# Patient Record
Sex: FEMALE | Race: WHITE | NOT HISPANIC OR LATINO | Employment: FULL TIME | URBAN - METROPOLITAN AREA
[De-identification: names, ages, dates, MRNs, and addresses within clinical notes are randomized per-mention and may not be internally consistent; named-entity substitution may affect disease eponyms.]

---

## 2022-10-16 ENCOUNTER — OFFICE VISIT (OUTPATIENT)
Dept: URGENT CARE | Facility: CLINIC | Age: 52
End: 2022-10-16
Payer: COMMERCIAL

## 2022-10-16 VITALS
OXYGEN SATURATION: 99 % | HEART RATE: 94 BPM | SYSTOLIC BLOOD PRESSURE: 169 MMHG | BODY MASS INDEX: 30.21 KG/M2 | WEIGHT: 160 LBS | DIASTOLIC BLOOD PRESSURE: 90 MMHG | TEMPERATURE: 99.9 F | RESPIRATION RATE: 20 BRPM | HEIGHT: 61 IN

## 2022-10-16 DIAGNOSIS — J06.9 VIRAL UPPER RESPIRATORY TRACT INFECTION: Primary | ICD-10-CM

## 2022-10-16 PROCEDURE — 99213 OFFICE O/P EST LOW 20 MIN: CPT | Performed by: PHYSICIAN ASSISTANT

## 2022-10-16 RX ORDER — ALPRAZOLAM 0.25 MG/1
0.25 TABLET ORAL
COMMUNITY
Start: 2022-04-21

## 2022-10-16 RX ORDER — CETIRIZINE HYDROCHLORIDE 10 MG/1
10 TABLET ORAL DAILY
COMMUNITY

## 2022-10-16 RX ORDER — LOSARTAN POTASSIUM AND HYDROCHLOROTHIAZIDE 12.5; 5 MG/1; MG/1
1 TABLET ORAL DAILY
COMMUNITY
Start: 2022-04-21 | End: 2023-04-21

## 2022-10-16 NOTE — PROGRESS NOTES
[facility]      NAME: Marva Underwood is a 46 y o  female  : 1970    MRN: [id number]  DATE: 2022  TIME: 11:42 AM    Assessment and Plan   Viral upper respiratory tract infection [J06 9]  1  Viral upper respiratory tract infection       Patient Instructions   Viral upper respiratory infection  Stop decongestant due to BP  Recommend mucinex and flonase for cough  Rest, fluids and supportive care  May benefit from a cool mist humidifier on night stand  Tylenol/ibuprofen as needed for pain/fever    Follow up with PCP in 3-5 days  Proceed to  ER if symptoms worsen  Chief Complaint     Chief Complaint   Patient presents with   • URI     Pt presents with worsening URI s/s started approx 2 days ago with worsening redness  History of Present Illness       Minesh Velazquez is a 60-year-old female who presents to clinic complaining cough and nasal congestion x5 days  She describes the cough as productive with a yellow to white sputum  She is also complaining itchy scratchy throat, nasal congestion, bilateral eye redness and this morning her eyes order slightly crusted  She denies any fever, chills, rhinorrhea, ear pain, nausea, vomiting, fatigue, myalgias, loss of taste or smell, recent travel, or exposure      Review of Systems   Review of Systems   Constitutional: Negative for chills, fatigue and fever  HENT: Positive for congestion and sore throat  Negative for ear pain, rhinorrhea, sinus pressure and sinus pain  Eyes: Positive for discharge and redness  Negative for photophobia, pain, itching and visual disturbance  Respiratory: Positive for cough  Negative for shortness of breath  Gastrointestinal: Negative for diarrhea, nausea and vomiting  Musculoskeletal: Negative for myalgias  Neurological: Negative for headaches           Current Medications       Current Outpatient Medications:   •  ALPRAZolam (XANAX) 0 25 mg tablet, Take 0 25 mg by mouth, Disp: , Rfl:   • losartan-hydrochlorothiazide (HYZAAR) 50-12 5 mg per tablet, Take 1 tablet by mouth daily, Disp: , Rfl:   •  cetirizine (ZyrTEC) 10 mg tablet, Take 10 mg by mouth daily, Disp: , Rfl:     Current Allergies     Allergies as of 10/16/2022 - Reviewed 10/16/2022   Allergen Reaction Noted   • Shellfish-derived products - food allergy Shortness Of Breath 09/09/2020   • Latex Rash 09/09/2020            The following portions of the patient's history were reviewed and updated as appropriate: allergies, current medications, past family history, past medical history, past social history, past surgical history and problem list      Past Medical History:   Diagnosis Date   • Anxiety    • Hypertension        History reviewed  No pertinent surgical history  History reviewed  No pertinent family history  Medications have been verified  Objective   /90   Pulse 94   Temp 99 9 °F (37 7 °C)   Resp 20   Ht 5' 1" (1 549 m)   Wt 72 6 kg (160 lb)   SpO2 99%   BMI 30 23 kg/m²   No LMP recorded  Physical Exam     Physical Exam  Vitals and nursing note reviewed  Constitutional:       General: She is not in acute distress  Appearance: Normal appearance  She is not ill-appearing  HENT:      Right Ear: Tympanic membrane, ear canal and external ear normal       Left Ear: Tympanic membrane, ear canal and external ear normal       Nose: Congestion present  Mouth/Throat:      Mouth: Mucous membranes are moist       Pharynx: No oropharyngeal exudate or posterior oropharyngeal erythema  Eyes:      General: Lids are normal  Vision grossly intact  Right eye: No discharge or hordeolum  Left eye: No discharge or hordeolum  Extraocular Movements: Extraocular movements intact  Conjunctiva/sclera:      Right eye: Right conjunctiva is injected  Left eye: Left conjunctiva is injected  Pupils: Pupils are equal, round, and reactive to light     Cardiovascular:      Rate and Rhythm: Normal rate and regular rhythm  Heart sounds: Normal heart sounds  Pulmonary:      Effort: Pulmonary effort is normal  No respiratory distress  Breath sounds: Normal breath sounds  No stridor  No wheezing, rhonchi or rales  Lymphadenopathy:      Cervical: No cervical adenopathy  Neurological:      Mental Status: She is alert and oriented to person, place, and time     Psychiatric:         Mood and Affect: Mood normal          Behavior: Behavior normal

## 2022-10-20 ENCOUNTER — OFFICE VISIT (OUTPATIENT)
Dept: URGENT CARE | Facility: CLINIC | Age: 52
End: 2022-10-20
Payer: COMMERCIAL

## 2022-10-20 VITALS
HEIGHT: 61 IN | WEIGHT: 165.4 LBS | RESPIRATION RATE: 18 BRPM | HEART RATE: 88 BPM | BODY MASS INDEX: 31.23 KG/M2 | TEMPERATURE: 99.6 F | OXYGEN SATURATION: 99 %

## 2022-10-20 DIAGNOSIS — J02.9 SORE THROAT: Primary | ICD-10-CM

## 2022-10-20 LAB
S PYO AG THROAT QL: NEGATIVE
SARS-COV-2 AG UPPER RESP QL IA: NEGATIVE
VALID CONTROL: NORMAL

## 2022-10-20 PROCEDURE — 87811 SARS-COV-2 COVID19 W/OPTIC: CPT | Performed by: PHYSICIAN ASSISTANT

## 2022-10-20 PROCEDURE — 87880 STREP A ASSAY W/OPTIC: CPT | Performed by: PHYSICIAN ASSISTANT

## 2022-10-20 PROCEDURE — 99203 OFFICE O/P NEW LOW 30 MIN: CPT | Performed by: PHYSICIAN ASSISTANT

## 2022-10-20 RX ORDER — AZITHROMYCIN 250 MG/1
TABLET, FILM COATED ORAL
Qty: 6 TABLET | Refills: 0 | Status: SHIPPED | OUTPATIENT
Start: 2022-10-20 | End: 2022-10-24

## 2022-10-20 NOTE — PATIENT INSTRUCTIONS
Flonase, Sudafed, Zyrtec for symptoms  If no improvement or any worsening by Monday morning, may start antibiotic

## 2022-10-20 NOTE — PROGRESS NOTES
[email or web address]        NAME: Liliana Young is a 46 y o  female  : 1970    MRN: [id number]  DATE: 2022  TIME: 6:59 PM    Assessment and Plan   Sore throat [J02 9]  1  Sore throat  POCT rapid strepA    Poct Covid 19 Rapid Antigen Test    azithromycin (ZITHROMAX) 250 mg tablet     Patient absolutely insistent on antibiotics even though the risks of antibiotic overuse and misuse were discussed, will provide a watch and wait prescription  Advised that if she is not better in 4-5 days or is feeling worse, then may begin antibiotics  Recommended Flonase, Zyrtec, Sudafed for relief of postnasal drip  Discussed strict return to care precautions as well as red flag symptoms which should prompt immediate ED referral  Pt verbalized understanding and is in agreement with plan  Please follow up with your primary care provider within the next week  Please remember that your visit today was with an urgent care provider and should not replace follow up with your primary care provider for chronic medical issues or annual physicals  Patient Instructions       Follow up with PCP in 3-5 days  Proceed to  ER if symptoms worsen  Chief Complaint     Chief Complaint   Patient presents with   • Sore Throat     Sore throat since last week was seen  not getting better         History of Present Illness       Liliana Young is a(n) 46 y o  female presenting with URI symptoms x 5 days  Past medical history: HTN, anxiety  Congestion: yes  Sore throat: yes  Cough: yes  Sputum production: no  Fever: no  Body aches: no  Loss of smell/taste: no  GI symptoms: no  Known sick contacts: no  OTC meds tried: mucinex  Reports that she knows her body and needs an antibiotic for the throat dryness  Review of Systems   Review of Systems   Constitutional: Negative for chills, diaphoresis, fatigue and fever  HENT: Positive for congestion, postnasal drip and sore throat   Negative for ear pain, rhinorrhea, sinus pain, sneezing and trouble swallowing  Eyes: Negative for pain and redness  Respiratory: Positive for cough  Negative for chest tightness, shortness of breath and wheezing  Cardiovascular: Negative for chest pain and leg swelling  Gastrointestinal: Negative for diarrhea, nausea and vomiting  Musculoskeletal: Negative for myalgias  Neurological: Negative for dizziness, weakness and headaches  Current Medications       Current Outpatient Medications:   •  ALPRAZolam (XANAX) 0 25 mg tablet, Take 0 25 mg by mouth, Disp: , Rfl:   •  azithromycin (ZITHROMAX) 250 mg tablet, Take 2 tablets today then 1 tablet daily x 4 days, Disp: 6 tablet, Rfl: 0  •  cetirizine (ZyrTEC) 10 mg tablet, Take 10 mg by mouth daily, Disp: , Rfl:   •  losartan-hydrochlorothiazide (HYZAAR) 50-12 5 mg per tablet, Take 1 tablet by mouth daily, Disp: , Rfl:     Current Allergies     Allergies as of 10/20/2022 - Reviewed 10/20/2022   Allergen Reaction Noted   • Shellfish-derived products - food allergy Shortness Of Breath 09/09/2020   • Latex Rash 09/09/2020            The following portions of the patient's history were reviewed and updated as appropriate: allergies, current medications, past family history, past medical history, past social history, past surgical history and problem list      Past Medical History:   Diagnosis Date   • Anxiety    • Hypertension        History reviewed  No pertinent surgical history  History reviewed  No pertinent family history  Medications have been verified  Objective   Pulse 88   Temp 99 6 °F (37 6 °C)   Resp 18   Ht 5' 1" (1 549 m)   Wt 75 kg (165 lb 6 4 oz)   SpO2 99%   BMI 31 25 kg/m²        Physical Exam     Physical Exam  Vitals and nursing note reviewed  Constitutional:       General: She is not in acute distress  Appearance: Normal appearance  She is not toxic-appearing  HENT:      Head: Normocephalic and atraumatic        Right Ear: Tympanic membrane, ear canal and external ear normal       Left Ear: Tympanic membrane, ear canal and external ear normal       Nose: No congestion  Mouth/Throat:      Mouth: Mucous membranes are moist       Pharynx: Oropharynx is clear  No oropharyngeal exudate or posterior oropharyngeal erythema  Tonsils: No tonsillar exudate or tonsillar abscesses  Eyes:      Conjunctiva/sclera: Conjunctivae normal       Pupils: Pupils are equal, round, and reactive to light  Cardiovascular:      Rate and Rhythm: Normal rate and regular rhythm  Heart sounds: Normal heart sounds  Pulmonary:      Effort: Pulmonary effort is normal  No respiratory distress  Breath sounds: Normal breath sounds  No wheezing, rhonchi or rales  Abdominal:      General: Abdomen is flat  Palpations: Abdomen is soft  Musculoskeletal:      Cervical back: Normal range of motion and neck supple  Skin:     General: Skin is warm and dry  Capillary Refill: Capillary refill takes less than 2 seconds  Neurological:      Mental Status: She is alert and oriented to person, place, and time     Psychiatric:         Behavior: Behavior normal

## 2023-01-10 ENCOUNTER — OFFICE VISIT (OUTPATIENT)
Dept: URGENT CARE | Facility: CLINIC | Age: 53
End: 2023-01-10

## 2023-01-10 VITALS
HEIGHT: 60 IN | OXYGEN SATURATION: 99 % | RESPIRATION RATE: 18 BRPM | BODY MASS INDEX: 32.55 KG/M2 | TEMPERATURE: 101.5 F | HEART RATE: 119 BPM | WEIGHT: 165.8 LBS

## 2023-01-10 DIAGNOSIS — J11.1 INFLUENZA: Primary | ICD-10-CM

## 2023-01-10 RX ORDER — ALBUTEROL SULFATE 90 UG/1
2 AEROSOL, METERED RESPIRATORY (INHALATION) EVERY 4 HOURS PRN
COMMUNITY

## 2023-01-10 RX ORDER — OSELTAMIVIR PHOSPHATE 75 MG/1
75 CAPSULE ORAL EVERY 12 HOURS SCHEDULED
Qty: 10 CAPSULE | Refills: 0 | Status: SHIPPED | OUTPATIENT
Start: 2023-01-10 | End: 2023-01-15

## 2023-01-10 NOTE — LETTER
January 10, 2023     Patient: Ibis Conrad   YOB: 1970   Date of Visit: 1/10/2023       To Whom It May Concern: It is my medical opinion that Ibis Conrad may return to work on 1/13/2023  Please excuse her absence until this time  If you have any questions or concerns, please don't hesitate to call           Sincerely,        Suzette Corley PA-C    CC: No Recipients

## 2023-01-10 NOTE — PROGRESS NOTES
[facility]        NAME: Sherin Jarquin is a 46 y o  female  : 1970    MRN: [id number]  DATE: January 10, 2023  TIME: 3:04 PM    Assessment and Plan   Influenza [J11 1]  1  Influenza  oseltamivir (TAMIFLU) 75 mg capsule            Patient Instructions     Patient Instructions   Take Tamiflu as instructed  Continue over-the-counter cough medication in conjunction with ibuprofen or Tylenol for fever  Follow-up with PCP in next few days  Follow up with PCP in 3-5 days  Proceed to  ER if symptoms worsen  Chief Complaint     Chief Complaint   Patient presents with   • Cold Like Symptoms   • Cough   • Fever     Exposed to Flu - daughter over weekend  Started last night with dry cough (spasmatic) with HA and body aches today with ear pressure and chills  Took Mucinex and Zyrtec         History of Present Illness       Patient is a 59-year-old female presenting today with flulike symptoms x1 day  Patient notes last night she quickly began to develop body aches and a headache followed by some nasal congestion and a dry cough, reports that her daughter is at home with the flu right now and has been in close contact with her for the last several days, has been taking occasional over-the-counter Tylenol and Mucinex which does provide some relief of her symptoms, has not taken any Tylenol today  Denies fever although febrile upon arrival   Denies SOB, trouble swallowing, N/V/D, chest tightness  Review of Systems   Review of Systems   Constitutional: Negative for chills and fatigue  HENT: Positive for congestion and postnasal drip  Negative for sore throat  Eyes: Negative for redness and itching  Respiratory: Positive for cough  Negative for chest tightness and shortness of breath  Cardiovascular: Negative for chest pain  Gastrointestinal: Negative for diarrhea, nausea and vomiting  Musculoskeletal: Positive for myalgias  Negative for arthralgias     Neurological: Positive for headaches  Negative for light-headedness  Current Medications       Current Outpatient Medications:   •  albuterol (PROVENTIL HFA,VENTOLIN HFA) 90 mcg/act inhaler, Inhale 2 puffs every 4 (four) hours as needed for wheezing, Disp: , Rfl:   •  ALPRAZolam (XANAX) 0 25 mg tablet, Take 0 25 mg by mouth, Disp: , Rfl:   •  cetirizine (ZyrTEC) 10 mg tablet, Take 10 mg by mouth daily, Disp: , Rfl:   •  losartan-hydrochlorothiazide (HYZAAR) 50-12 5 mg per tablet, Take 1 tablet by mouth daily, Disp: , Rfl:   •  oseltamivir (TAMIFLU) 75 mg capsule, Take 1 capsule (75 mg total) by mouth every 12 (twelve) hours for 5 days, Disp: 10 capsule, Rfl: 0    Current Allergies     Allergies as of 01/10/2023 - Reviewed 01/10/2023   Allergen Reaction Noted   • Shellfish-derived products - food allergy Shortness Of Breath 09/09/2020   • Latex Rash 09/09/2020            The following portions of the patient's history were reviewed and updated as appropriate: allergies, current medications, past family history, past medical history, past social history, past surgical history and problem list      Past Medical History:   Diagnosis Date   • Anxiety    • Hypertension        History reviewed  No pertinent surgical history  History reviewed  No pertinent family history  Medications have been verified  Objective   Pulse (!) 119   Temp (!) 101 5 °F (38 6 °C)   Resp 18   Ht 5' (1 524 m)   Wt 75 2 kg (165 lb 12 8 oz)   SpO2 99%   BMI 32 38 kg/m²        Physical Exam     Physical Exam  Vitals and nursing note reviewed  Constitutional:       General: She is not in acute distress  Appearance: Normal appearance  HENT:      Head: Normocephalic and atraumatic  Right Ear: Tympanic membrane, ear canal and external ear normal       Left Ear: Tympanic membrane, ear canal and external ear normal       Nose: Congestion present  Mouth/Throat:      Mouth: Mucous membranes are moist       Pharynx: Oropharynx is clear  Eyes:      Conjunctiva/sclera: Conjunctivae normal    Cardiovascular:      Rate and Rhythm: Normal rate and regular rhythm  Pulses: Normal pulses  Heart sounds: Normal heart sounds  Pulmonary:      Effort: Pulmonary effort is normal       Breath sounds: Normal breath sounds  Musculoskeletal:      Cervical back: Normal range of motion  Skin:     General: Skin is warm  Capillary Refill: Capillary refill takes less than 2 seconds  Neurological:      Mental Status: She is alert

## 2023-01-10 NOTE — PATIENT INSTRUCTIONS
Take Tamiflu as instructed  Continue over-the-counter cough medication in conjunction with ibuprofen or Tylenol for fever  Follow-up with PCP in next few days

## 2023-10-17 ENCOUNTER — OFFICE VISIT (OUTPATIENT)
Dept: URGENT CARE | Facility: CLINIC | Age: 53
End: 2023-10-17

## 2023-10-17 VITALS
SYSTOLIC BLOOD PRESSURE: 158 MMHG | WEIGHT: 158.6 LBS | DIASTOLIC BLOOD PRESSURE: 80 MMHG | OXYGEN SATURATION: 96 % | HEIGHT: 60 IN | BODY MASS INDEX: 31.14 KG/M2 | TEMPERATURE: 98.2 F | HEART RATE: 98 BPM | RESPIRATION RATE: 18 BRPM

## 2023-10-17 DIAGNOSIS — J30.1 SEASONAL ALLERGIC RHINITIS DUE TO POLLEN: ICD-10-CM

## 2023-10-17 DIAGNOSIS — J01.10 ACUTE NON-RECURRENT FRONTAL SINUSITIS: Primary | ICD-10-CM

## 2023-10-17 RX ORDER — PREDNISONE 20 MG/1
TABLET ORAL
Qty: 15 TABLET | Refills: 0 | Status: SHIPPED | OUTPATIENT
Start: 2023-10-17

## 2023-10-17 RX ORDER — AMOXICILLIN 500 MG/1
500 CAPSULE ORAL EVERY 8 HOURS SCHEDULED
Qty: 30 CAPSULE | Refills: 0 | Status: SHIPPED | OUTPATIENT
Start: 2023-10-17 | End: 2023-10-27

## 2023-10-17 RX ORDER — LEVOCETIRIZINE DIHYDROCHLORIDE 5 MG/1
5 TABLET, FILM COATED ORAL DAILY PRN
COMMUNITY

## 2023-10-17 NOTE — PROGRESS NOTES
[facility]        NAME: Janelle Davila is a 48 y.o. female  : 1970    MRN: [id number]  DATE: 2023  TIME: 3:28 PM    Assessment and Plan   Acute non-recurrent frontal sinusitis [J01.10]  1. Acute non-recurrent frontal sinusitis  amoxicillin (AMOXIL) 500 mg capsule    predniSONE 20 mg tablet      2. Seasonal allergic rhinitis due to pollen  predniSONE 20 mg tablet            Patient Instructions     1. Over-the-counter ibuprofen and/or acetaminophen as needed for pain or fever. 2. Oxymetazoline nasal spray 2 sprays in each nostril every 12 hours for no more than the next 5 days as needed for nasal congestion. 3. Over-the-counter guaifenesin as needed for mucus relief. 4. Gargle salt water as needed for sore throat relief. 5. Increase oral fluid consumption. 6. Follow-up with primary care provider in 7 days for any persistent symptoms. Chief Complaint     Chief Complaint   Patient presents with    Cold Like Symptoms    Cough     X > 1 week - allergy s/s. Started x 3 days with dry cough, rhinorrhea and PND with HA, facial pressure and sl. Puffiness. No fever, sore throat , chest tightness/SOB. Taking Xyzal and Mucinex D         History of Present Illness       77-year-old female patient with a 7 to 10-day history of persistent nasal congestion, sneezing, runny nose, postnasal drip, intermittent cough. Patient states over the last 3 days she has developed worsening forehead pain and pressure as well as tenderness. Rhinorrhea has become thick. No fever or chills. No sore throat. No shortness of breath or chest pain. No wheezing. Review of Systems   Review of Systems   Constitutional:  Negative for chills, fatigue and fever. HENT:  Positive for congestion, postnasal drip, rhinorrhea, sinus pressure and sinus pain. Negative for facial swelling and sore throat. Respiratory:  Positive for cough. Cardiovascular:  Negative for chest pain.    Gastrointestinal:  Negative for abdominal pain. Musculoskeletal:  Negative for myalgias. Skin:  Negative for rash. Neurological:  Positive for headaches. Current Medications       Current Outpatient Medications:     ALPRAZolam (XANAX) 0.25 mg tablet, Take 0.25 mg by mouth, Disp: , Rfl:     amoxicillin (AMOXIL) 500 mg capsule, Take 1 capsule (500 mg total) by mouth every 8 (eight) hours for 10 days, Disp: 30 capsule, Rfl: 0    cetirizine (ZyrTEC) 10 mg tablet, Take 10 mg by mouth daily, Disp: , Rfl:     levocetirizine (XYZAL) 5 MG tablet, Take 5 mg by mouth daily as needed for allergies, Disp: , Rfl:     predniSONE 20 mg tablet, Take 3 tabs all at once daily for 3 days then 2 tabs for 2 days then 1 tab for 2 days. , Disp: 15 tablet, Rfl: 0    albuterol (PROVENTIL HFA,VENTOLIN HFA) 90 mcg/act inhaler, Inhale 2 puffs every 4 (four) hours as needed for wheezing (Patient not taking: Reported on 10/17/2023), Disp: , Rfl:     losartan-hydrochlorothiazide (HYZAAR) 50-12.5 mg per tablet, Take 1 tablet by mouth daily, Disp: , Rfl:     Current Allergies     Allergies as of 10/17/2023 - Reviewed 10/17/2023   Allergen Reaction Noted    Shellfish-derived products - food allergy Shortness Of Breath 09/09/2020    Latex Rash 09/09/2020            The following portions of the patient's history were reviewed and updated as appropriate: allergies, current medications, past family history, past medical history, past social history, past surgical history and problem list.     Past Medical History:   Diagnosis Date    Anxiety     Hypertension        History reviewed. No pertinent surgical history. No family history on file. Medications have been verified. Objective   /80   Pulse 98   Temp 98.2 °F (36.8 °C)   Resp 18   Ht 5' (1.524 m)   Wt 71.9 kg (158 lb 9.6 oz)   SpO2 96%   BMI 30.97 kg/m²        Physical Exam     Physical Exam  Vitals and nursing note reviewed.    Constitutional:       General: She is not in acute distress. Appearance: Normal appearance. She is not ill-appearing or toxic-appearing. HENT:      Head: Normocephalic. Right Ear: Tympanic membrane normal.      Left Ear: Tympanic membrane normal.      Nose: Congestion and rhinorrhea present. Comments: Frontal sinuses moderately tender on exam.     Mouth/Throat:      Mouth: Mucous membranes are moist.   Eyes:      Conjunctiva/sclera: Conjunctivae normal.      Pupils: Pupils are equal, round, and reactive to light. Cardiovascular:      Rate and Rhythm: Normal rate and regular rhythm. Pulses: Normal pulses. Pulmonary:      Effort: Pulmonary effort is normal.      Breath sounds: Normal breath sounds. Musculoskeletal:         General: Normal range of motion. Cervical back: Normal range of motion. Skin:     General: Skin is warm and dry. Capillary Refill: Capillary refill takes less than 2 seconds. Neurological:      Mental Status: She is alert and oriented to person, place, and time.    Psychiatric:         Mood and Affect: Mood normal.         Behavior: Behavior normal.